# Patient Record
Sex: MALE | Race: WHITE | URBAN - METROPOLITAN AREA
[De-identification: names, ages, dates, MRNs, and addresses within clinical notes are randomized per-mention and may not be internally consistent; named-entity substitution may affect disease eponyms.]

---

## 2024-11-12 ENCOUNTER — APPOINTMENT (OUTPATIENT)
Dept: UROLOGY | Facility: CLINIC | Age: 39
End: 2024-11-12
Payer: COMMERCIAL

## 2024-12-09 ENCOUNTER — APPOINTMENT (OUTPATIENT)
Dept: UROLOGY | Facility: HOSPITAL | Age: 39
End: 2024-12-09
Payer: COMMERCIAL

## 2025-01-06 ENCOUNTER — APPOINTMENT (OUTPATIENT)
Dept: UROLOGY | Facility: HOSPITAL | Age: 40
End: 2025-01-06
Payer: COMMERCIAL

## 2025-01-06 DIAGNOSIS — Z30.09 VASECTOMY EVALUATION: Primary | ICD-10-CM

## 2025-01-06 PROCEDURE — 99213 OFFICE O/P EST LOW 20 MIN: CPT | Performed by: UROLOGY

## 2025-01-06 PROCEDURE — 99203 OFFICE O/P NEW LOW 30 MIN: CPT | Performed by: UROLOGY

## 2025-01-06 PROCEDURE — 1036F TOBACCO NON-USER: CPT | Performed by: UROLOGY

## 2025-01-06 ASSESSMENT — PATIENT HEALTH QUESTIONNAIRE - PHQ9
2. FEELING DOWN, DEPRESSED OR HOPELESS: NOT AT ALL
1. LITTLE INTEREST OR PLEASURE IN DOING THINGS: NOT AT ALL
SUM OF ALL RESPONSES TO PHQ9 QUESTIONS 1 AND 2: 0

## 2025-01-06 NOTE — PROGRESS NOTES
Subjective   Carlton Macdonald is a 39 y.o. male here for evaluation regarding elective sterilization. he is aware of alternatives to vasectomy.     Previous Children: 4 (12, 10 10, 4)  Previous scrotal surgeries? no  Any current scrotal pain? no  Taking blood thinners? no    No past medical history on file.    No past surgical history on file.       Your medication list      as of January 6, 2025  9:25 AM     You have not been prescribed any medications.         Not on File     Exam  Testicles descended bilaterally, vas palpable  Penis without lesions  Testicles high, difficult exam    #Vasectomy  Desires elective vasectomy.  In summary, the patient has a history of fecundity anxiety and would like to proceed with a vasectomy.  I had a detailed discussion with him regarding the risks, benefits and alternatives to the procedure and he would like to proceed at this time.      I discussed the following with the patient:    · Vasectomy is intended to be a permanent form of contraception.  · Vasectomy does not produce immediate sterility.  · Following vasectomy, another form of contraception is required until vas occlusion is confirmed by post- vasectomy semen analysis (PVSA).  · Even after vas occlusion is confirmed, vasectomy is not 100% reliable in preventing pregnancy.  · The risk of pregnancy after vasectomy is approximately 1 in 2,000 for men who have post-vasectomy azoospermia or PVSA showing rare non-motile sperm (RNMS).  · Repeat vasectomy is necessary in <1% of vasectomies, provided that a technique for vas occlusion known to have a low occlusive failure rate has been used.  · Patients should refrain from ejaculation for approximately one week after vasectomy.  · Options for fertility after vasectomy include vasectomy reversal and sperm retrieval with in vitro fertilization. These options are not always successful, and they may be expensive.    Sperm banking was also offered to the patient.    He understands  that he must have protected intercourse after the vasectomy until he is able to provide a negative semen sample.  He may stop using other methods of contraception when examination of one well-mixed, uncentrifuged, fresh post-vasectomy semen specimen shows azoospermia or only rare non-motile sperm (RNMS or <100,000 non-motile sperm/mL).    Will proceed with vasectomy with: General  Sperm Banking information given per patient request : no

## 2025-01-07 PROBLEM — Z30.09 VASECTOMY EVALUATION: Status: ACTIVE | Noted: 2025-01-06

## 2025-02-13 ENCOUNTER — APPOINTMENT (OUTPATIENT)
Dept: DERMATOLOGY | Facility: CLINIC | Age: 40
End: 2025-02-13
Payer: COMMERCIAL

## 2025-02-13 DIAGNOSIS — L91.8 SKIN TAG: ICD-10-CM

## 2025-02-13 DIAGNOSIS — B07.8 COMMON WART: Primary | ICD-10-CM

## 2025-02-13 PROCEDURE — 99203 OFFICE O/P NEW LOW 30 MIN: CPT | Performed by: DERMATOLOGY

## 2025-02-13 RX ORDER — IMIQUIMOD 12.5 MG/.25G
CREAM TOPICAL
Qty: 30 PACKET | Refills: 0 | Status: SHIPPED | OUTPATIENT
Start: 2025-02-13

## 2025-02-13 NOTE — PROGRESS NOTES
Dermatology Allergy Patch Testing Visit:     Subjective   Carlton Macdonald was {Referred by:[id number]}.    Carlton Macdonald is a 39 y.o. male who presents for reason for visit of No chief complaint on file..                                                                                    Objective   Physical Exam    Assessment/Plan :  Response:

## 2025-02-13 NOTE — PROGRESS NOTES
Subjective     Carlton Macdonald is a 39 y.o. male who presents for the following: Skin Check (Skin check-preventative (lots of moles)  & has a wart on palm of right hand for several years and will not go away). Wart on right hand.  Above the waist examined.  He does a lot of work around the house with his hands.  No other calluses on his hands.      Review of Systems:  No other skin or systemic complaints other than what is documented elsewhere in the note.    The following portions of the chart were reviewed this encounter and updated as appropriate:          Skin Cancer History  No skin cancer on file.      Specialty Problems    None       Objective   Well appearing patient in no apparent distress; mood and affect are within normal limits.    A focused skin examination was performed. All findings within normal limits unless otherwise noted below.    Assessment/Plan

## 2025-02-26 ENCOUNTER — APPOINTMENT (OUTPATIENT)
Dept: PREADMISSION TESTING | Facility: HOSPITAL | Age: 40
End: 2025-02-26
Payer: COMMERCIAL

## 2025-03-05 ENCOUNTER — PRE-ADMISSION TESTING (OUTPATIENT)
Dept: PREADMISSION TESTING | Facility: HOSPITAL | Age: 40
End: 2025-03-05
Payer: COMMERCIAL

## 2025-03-05 VITALS
RESPIRATION RATE: 16 BRPM | WEIGHT: 168.2 LBS | OXYGEN SATURATION: 99 % | BODY MASS INDEX: 26.4 KG/M2 | SYSTOLIC BLOOD PRESSURE: 134 MMHG | HEART RATE: 55 BPM | HEIGHT: 67 IN | TEMPERATURE: 97.9 F | DIASTOLIC BLOOD PRESSURE: 86 MMHG

## 2025-03-05 DIAGNOSIS — Z30.09 VASECTOMY EVALUATION: ICD-10-CM

## 2025-03-05 PROCEDURE — 99203 OFFICE O/P NEW LOW 30 MIN: CPT

## 2025-03-05 RX ORDER — BISMUTH SUBSALICYLATE 262 MG
1 TABLET,CHEWABLE ORAL DAILY
COMMUNITY

## 2025-03-05 ASSESSMENT — DUKE ACTIVITY SCORE INDEX (DASI)
CAN YOU DO LIGHT WORK AROUND THE HOUSE LIKE DUSTING OR WASHING DISHES: YES
CAN YOU WALK INDOORS, SUCH AS AROUND YOUR HOUSE: YES
TOTAL_SCORE: 58.2
CAN YOU RUN A SHORT DISTANCE: YES
CAN YOU TAKE CARE OF YOURSELF (EAT, DRESS, BATHE, OR USE TOILET): YES
DASI METS SCORE: 9.9
CAN YOU CLIMB A FLIGHT OF STAIRS OR WALK UP A HILL: YES
CAN YOU PARTICIPATE IN STRENOUS SPORTS LIKE SWIMMING, SINGLES TENNIS, FOOTBALL, BASKETBALL, OR SKIING: YES
CAN YOU PARTICIPATE IN MODERATE RECREATIONAL ACTIVITIES LIKE GOLF, BOWLING, DANCING, DOUBLES TENNIS OR THROWING A BASEBALL OR FOOTBALL: YES
CAN YOU DO YARD WORK LIKE RAKING LEAVES, WEEDING OR PUSHING A MOWER: YES
CAN YOU HAVE SEXUAL RELATIONS: YES
CAN YOU DO HEAVY WORK AROUND THE HOUSE LIKE SCRUBBING FLOORS OR LIFTING AND MOVING HEAVY FURNITURE: YES
CAN YOU DO MODERATE WORK AROUND THE HOUSE LIKE VACUUMING, SWEEPING FLOORS OR CARRYING GROCERIES: YES
CAN YOU WALK A BLOCK OR TWO ON LEVEL GROUND: YES

## 2025-03-05 NOTE — H&P (VIEW-ONLY)
[facility]/PAT Evaluation       Name: Carlton Macdonald (Carlton Macdonald)  /Age: 1985/39 y.o.     In-Person       Chief Complaint: Vasectomy evaluation     HPI  Patient is an alert and oriented 39 year old male scheduled for a vasectomy on 3/14/2025 with Dr. Cat for  elective sterilization. He denies pain and does not endorse any abnormal urinary symptoms. Presents to [facility] today for perioperative risk stratification and optimization.     No past medical history on file.    Past Surgical History:   Procedure Laterality Date    CHOLESTEATOMA EXCISION       Patient  has no history on file for sexual activity.    No family history on file.    No Known Allergies    Prior to Admission medications    Medication Sig Start Date End Date Taking? Authorizing Provider   multivitamin tablet Take 1 tablet by mouth once daily.   Yes Historical Provider, MD   imiquimod (Aldara) 5 % cream Apply to the affected asha of the Once daily. Wash hands thoroughly after each application. 25   Zaheer Menendez MD       Review of Systems   Constitutional: Negative for chills, decreased appetite, diaphoresis, fever and malaise/fatigue.   Eyes:  Negative for blurred vision and double vision.   Cardiovascular:  Negative for chest pain, claudication, cyanosis, dyspnea on exertion, irregular heartbeat, leg swelling, near-syncope and palpitations.   Respiratory:  Negative for cough, hemoptysis, shortness of breath and wheezing.    Endocrine: Negative for cold intolerance, heat intolerance, polydipsia, polyphagia and polyuria.   Gastrointestinal:  Negative for abdominal pain, constipation, diarrhea, dysphagia, nausea and vomiting.   Genitourinary:  Negative for bladder incontinence, dysuria, hematuria, incomplete emptying, nocturia, frequency, pelvic pain and urgency.   Neurological:  Negative for headaches, light-headedness, paresthesias, sensory change and weakness.   Psychiatric/Behavioral:  Negative for altered mental status.   "  Musculoskeletal: Negative for myalgias, arthralgias     Vitals and nursing note reviewed.     Physical exam  Constitutional:       Appearance: Normal appearance. He is Overweight.   HENT:      Head: Normocephalic and atraumatic.      Mouth/Throat:      Mouth: Mucous membranes are moist.      Pharynx: Oropharynx is clear.   Eyes:      Extraocular Movements: Extraocular movements intact.      Conjunctiva/sclera: Conjunctivae normal.      Pupils: Pupils are equal, round, and reactive to light.   Cardiovascular:      PMI at left midclavicular line. Normal rate. Regular rhythm. Normal S1. Normal S2.       Murmurs: There is no murmur.      No gallop.  No click. No rub.       No audible carotid bruit     No lower extremity edema on exam  Pulmonary:      Effort: Pulmonary effort is normal.      Breath sounds: Normal breath sounds.   Abdominal:      General: Abdomen is flat. Bowel sounds are normal.      Palpations: Abdomen is soft and non-tender  Musculoskeletal:      Cervical back: Normal range of motion and neck supple.   Skin:     General: Skin is warm and dry.      Capillary Refill: Capillary refill takes less than 2 seconds.   Neurological:      General: No focal deficit present.      Mental Status: He is alert and oriented to person, place, and time. Mental status is at baseline.   Psychiatric:         Mood and Affect: Mood normal.         Behavior: Behavior normal.         Thought Content: Thought content normal.         Judgment: Judgment normal.     Vitals and nursing note reviewed. Physical exam within normal limits.     Visit Vitals  /86   Pulse 55   Temp 36.6 °C (97.9 °F)   Resp 16   Ht 1.702 m (5' 7\")   Wt 76.3 kg (168 lb 3.2 oz)   SpO2 99%   BMI 26.34 kg/m²   Smoking Status Never   BSA 1.9 m²       DASI Risk Score      Flowsheet Row Pre-Admission Testing from 3/5/2025 in [facility]   Can you take care of yourself (eat, dress, bathe, or use toilet)?  2.75 filed at 03/05/2025 1635   Can " you walk indoors, such as around your house? 1.75 filed at 03/05/2025 1635   Can you walk a block or two on level ground?  2.75 filed at 03/05/2025 1635   Can you climb a flight of stairs or walk up a hill? 5.5 filed at 03/05/2025 1635   Can you run a short distance? 8 filed at 03/05/2025 1635   Can you do light work around the house like dusting or washing dishes? 2.7 filed at 03/05/2025 1635   Can you do moderate work around the house like vacuuming, sweeping floors or carrying groceries? 3.5 filed at 03/05/2025 1635   Can you do heavy work around the house like scrubbing floors or lifting and moving heavy furniture?  8 filed at 03/05/2025 1635   Can you do yard work like raking leaves, weeding or pushing a mower? 4.5 filed at 03/05/2025 1635   Can you have sexual relations? 5.25 filed at 03/05/2025 1635   Can you participate in moderate recreational activities like golf, bowling, dancing, doubles tennis or throwing a baseball or football? 6 filed at 03/05/2025 1635   Can you participate in strenous sports like swimming, singles tennis, football, basketball, or skiing? 7.5 filed at 03/05/2025 1635   DASI SCORE 58.2 filed at 03/05/2025 1635   METS Score (Will be calculated only when all the questions are answered) 9.9 filed at 03/05/2025 1635          Caprini DVT Assessment      Flowsheet Row Pre-Admission Testing from 3/5/2025 in [facility]   DVT Score (IF A SCORE IS NOT CALCULATING, MUST SELECT A BMI TO COMPLETE) 3 filed at 03/05/2025 1635   Surgical Factors Minor surgery planned filed at 03/05/2025 1635   BMI (BMI MUST BE CHOSEN) 30 or less filed at 03/05/2025 1635          Modified Frailty Index      Flowsheet Row Pre-Admission Testing from 3/5/2025 in [facility]   Non-independent functional status (problems with dressing, bathing, personal grooming, or cooking) 0 filed at 03/05/2025 1635   History of diabetes mellitus  0 filed at 03/05/2025 1635   History of COPD 0 filed at  03/05/2025 1635   History of CHF No filed at 03/05/2025 1635   History of MI 0 filed at 03/05/2025 1635   History of Percutaneous Coronary Intervention, Cardiac Surgery, or Angina No filed at 03/05/2025 1635   Hypertension requiring the use of medication  0 filed at 03/05/2025 1635   Peripheral vascular disease 0 filed at 03/05/2025 1635   Impaired sensorium (cognitive impairement or loss, clouding, or delirium) 0 filed at 03/05/2025 1635   TIA or CVA withouy residual deficit 0 filed at 03/05/2025 1635   Cerebrovascular accident with deficit 0 filed at 03/05/2025 1635   Modified Frailty Index Calculator 0 filed at 03/05/2025 1635          [id number] Stroke Risk Points  Current as of just now        N/A 0 to 9 Points:      Last Change: N/A          The [id number] risk score (Lip SANG, et al. 2009. © 2010 [employer]) quantifies the risk of stroke for a patient with atrial fibrillation. For patients without atrial fibrillation or under the age of 18 this score appears as N/A. Higher score values generally indicate higher risk of stroke.        This score is not applicable to this patient. Components are not calculated.          Revised Cardiac Risk Index      Flowsheet Row Pre-Admission Testing from 3/5/2025 in [facility]   High-Risk Surgery (Intraperitoneal, Intrathoracic,Suprainguinal vascular) 0 filed at 03/05/2025 1635   History of ischemic heart disease (History of MI, History of positive exercuse test, Current chest paint considered due to myocardial ischemia, Use of nitrate therapy, ECG with pathological Q Waves) 0 filed at 03/05/2025 1635   History of congestive heart failure (pulmonary edemia, bilateral rales or S3 gallop, Paroxysmal nocturnal dyspnea, CXR showing pulmonary vascular redistribution) 0 filed at 03/05/2025 1635   History of cerebrovascular disease (Prior TIA or stroke) 0 filed at 03/05/2025 1635   Pre-operative insulin treatment 0 filed at 03/05/2025  1635   Pre-operative creatinine>2 mg/dl 0 filed at 03/05/2025 1635   Revised Cardiac Risk Calculator 0 filed at 03/05/2025 1635          Apfel Simplified Score    No data to display       Risk Analysis Index Results This Encounter    No data found in the last 10 encounters.       Stop Bang Score      Flowsheet Row Pre-Admission Testing from 3/5/2025 in [facility]   Do you snore loudly? 0 filed at 03/05/2025 1619   Do you often feel tired or fatigued after your sleep? 0 filed at 03/05/2025 1619   Has anyone ever observed you stop breathing in your sleep? 0 filed at 03/05/2025 1619   Do you have or are you being treated for high blood pressure? 0 filed at 03/05/2025 1619   Recent BMI (Calculated) 26.3 filed at 03/05/2025 1619   Is BMI greater than 35 kg/m2? 0=No filed at 03/05/2025 1619   Age older than 50 years old? 0=No filed at 03/05/2025 1619   Is your neck circumference greater than 17 inches (Male) or 16 inches (Female)? 0 filed at 03/05/2025 1619   Gender - Male 1=Yes filed at 03/05/2025 1619   STOP-BANG Total Score 1 filed at 03/05/2025 1619          Prodigy: High Risk  Total Score: 8              Prodigy Gender Score          ARISCAT Score for Postoperative Pulmonary Complications      Flowsheet Row Pre-Admission Testing from 3/5/2025 in [facility]   Age Calculated Score 0 filed at 03/05/2025 1636   Preoperative SpO2 0 filed at 03/05/2025 1636   Respiratory infection in the last month Either upper or lower (i.e., URI, bronchitis, pneumonia), with fever and antibiotic treatment 0 filed at 03/05/2025 1636   Preoperative anemia (Hgb less than 10 g/dl) 0 filed at 03/05/2025 1636   Surgical incision  0 filed at 03/05/2025 1636   Duration of surgery  0 filed at 03/05/2025 1636   Emergency Procedure  0 filed at 03/05/2025 1636   ARISCAT Total Score  0 filed at 03/05/2025 1636          Rojas Perioperative Risk for Myocardial Infarction or Cardiac Arrest (RIAZ)      Flowsheet Row  Pre-Admission Testing from 3/5/2025 in [facility]   Calculated Age Score 0.8 filed at 03/05/2025 1636   Functional Status  0 filed at 03/05/2025 1636   ASA Class  -5.17 filed at 03/05/2025 1636   Creatinine 0 filed at 03/05/2025 1636   Type of Procedure  -0.26 filed at 03/05/2025 1636   RIAZ Total Score  -9.88 filed at 03/05/2025 1636   RIAZ % 0.01 filed at 03/05/2025 1636          Assessment & Plan:    Neuro:  No diagnosis or significant findings on chart review or clinical presentation and evaluation.     HEENT/Airway:  No diagnosis or significant findings on chart review or clinical presentation and evaluation.   STOP-BANG Score-1 points low risk for ESTELLE    Mallampati::  II    TM distance::  >3 FB    Neck ROM::  Full  Dentures-denies  Crowns-denies  Implants-denies    Cardiovascular:  No diagnosis or significant findings on chart review or clinical presentation and evaluation.   METS: 9.9  RCRI: 0 points, 3.9%  risk for postoperative MACE   RIAZ: 0.01% risk for postoperative MACE    Pulmonary:  No diagnosis or significant findings on chart review or clinical presentation and evaluation.   ARISCAT: <26 points, 1.6% risk of in-hospital postoperative pulmonary complication  PRODIGY: Moderate risk for opioid induced respiratory depression  Smoking History-He has never smoked.  Discussed smoking cessation and deep breathing handout given    Renal/Urinary:  No diagnosis or significant findings on chart review or clinical presentation and evaluation, however, the patient is at increased risk of perioperative renal complications secondary to male sex. Preventative measures include BP monitoring, medication compliance, and hydration management.   CMP-Reviewed, stable  Creatinine-1.06  GFR-91    Endocrine:  No diagnosis or significant findings on chart review or clinical presentation and evaluation.   DRO0M-4.1%    Hematologic/Immunology:  No diagnosis or significant findings on chart review or clinical  presentation and evaluation.  The patient is not a Jehovah’s witness and will accept blood and blood products if medically indicated.   History of previous blood transfusions No  CBC-Reviewed, stable  HGB-15.0  Positive for thrombocytopenia-Platelets 141. No abnormal bruising or bleeding.   Caprini Score 3, patient at Moderate for postoperative DVT. Pt supplied education/VTE handout  Anticoagulation use: No     Gastrointestinal:   No diagnosis or significant findings on chart review or clinical presentation and evaluation.   Recreational drug use: alcohol  Alcohol use social drinker    Infectious disease:   No diagnosis or significant findings on chart review or clinical presentation and evaluation.     Musculoskeletal:   No diagnosis or significant findings on chart review or clinical presentation and evaluation.   JHFRAT score-0 points. low risk for falls    Anesthesia:  ASA 1 - Normal health patient  Anticipated anesthesia-general  History of General anesthesia- yes  Complications- No anesthesia complications  No family history of anesthesia complications    Abnormalities noted on PAT evaluation: No    Labs & Imaging ordered:  NA  Nickel/metal allergy-negative  Shellfish allergy-negative    Overall, patient Low Risk for the scheduled Low Risk surgery. Discussed with patient medication instructions, NPO guidelines, and any questions or concerns.     Face to face time spent 30 minutes

## 2025-03-05 NOTE — PREPROCEDURE INSTRUCTIONS
Medication List            Accurate as of March 5, 2025  4:24 PM. Always use your most recent med list.                imiquimod 5 % cream  Commonly known as: Aldara  Apply to the affected asha of the Once daily. Wash hands thoroughly after each application.  Medication Adjustments for Surgery: Do Not take on the morning of surgery  Notes to patient: Last dose preoperatively 3/13/2025     multivitamin tablet  Additional Medication Adjustments for Surgery: Take last dose 7 days before surgery  Notes to patient: Last dose preoperatively 3/6/2025            NPO Instructions:     Do not eat any food after midnight the night before your surgery/procedure.  You may have clear liquids until TWO hours before surgery/procedure. This includes water, black tea/coffee, (no milk or cream) apple juice and electrolyte drinks (Gatorade).  You may chew gum up to TWO hours before your surgery/procedure.     Additional Instructions:      Seven/Six Days before Surgery:  Review your medication instructions, stop indicated medications  Five Days before Surgery:  Review your medication instructions, stop indicated medications  Three Days before Surgery:  Review your medication instructions, stop indicated medications  The Day before Surgery:  No smoking or alcohol use 24 hours before surgery  Review your medication instructions, stop indicated medications  You will be contacted regarding the time of your arrival to facility and surgery time  Do not eat any food after Midnight  Day of Surgery:  Review your medication instructions, take indicated medications  If you have diabetes, please check your fasting blood sugar upon awakening.  If fasting blood sugar is <80 mg/dl, drink 100 ml of apple juice, time limit of 2 hours before  You may have clear liquids until TWO hours before surgery/procedure.  This includes water, black tea/coffee, (no milk or cream) apple juice and electrolyte drinks (Gatorade)  You may chew gum up to TWO hours  before your surgery/procedure  Wear  comfortable loose fitting clothing  Do not use moisturizers, creams, lotions or perfume  All jewelry and valuables should be left at home     CONTACT SURGEON'S OFFICE IF YOU DEVELOP:  * Fever = 100.4 F   * New respiratory symptoms (e.g. cough, shortness of breath, respiratory distress, sore throat)  * Recent loss of taste or smell  *Flu like symptoms such as headache, fatigue or gastrointestinal symptoms  * You develop any open sores, shingles, burning or painful urination   AND/OR:  * You no longer wish to have the surgery.  * Any other personal circumstances change that may lead to the need to cancel or defer this surgery.  *You were admitted to any hospital within one week of your planned procedure.     SMOKING:  *Quitting smoking can make a huge difference to your health and recovery from surgery.    *If you need help with quitting, call 3-031-QUIT-NOW.     THE DAY BEFORE SURGERY:  *Do not eat any food after midnight the night before your surgery.   *You may have up to TEN OUNCES of clear liquids until TWO hours before your instructed ARRIVAL TIME to hospital. This includes water, black tea/coffee, (no milk or cream) apple juice, clear broth and electrolyte drinks (Gatorade). Please avoid clear liquids that are red in color.   *You may chew gum/mints up to TWO hours before your surgery/procedure.     SURGICAL TIME:  *You will be contacted between 2 p.m. and 3 p.m. the business day before your surgery with your arrival time.  *If you haven't received a call by 3pm, call [phone]  *Scheduled surgery times may change and you will be notified if this occurs-check your personal voicemail for any updates.     ON THE MORNING OF SURGERY:  *Wear comfortable, loose fitting clothing.   *Do not use moisturizers, creams, lotions or perfume.  *All jewelry and valuables should be left at home.  *Prosthetic devices such as contact lenses, hearing aids, dentures, eyelash extensions,  hairpins and body piercing must be removed before surgery.     BRING WITH YOU:  *Photo ID and insurance card  *Current list of medications and allergies  *Pacemaker/Defibrillator/Heart stent cards  *CPAP machine and mask  *Slings/splints/crutches  *Copy of your complete Advanced Directive/DHPOA-if applicable  *Neurostimulator implant remote     PARKING AND ARRIVAL:  *Check in at the Main Entrance desk and let them know you are here for surgery.     IF YOU ARE HAVING OUTPATIENT/SAME DAY SURGERY:  *A responsible adult MUST accompany you at the time of discharge and stay with you for 24 hours after your surgery.  *You may NOT drive yourself home after surgery.  *You may use a taxi or ride sharing service ([employer], [employer]) to return home ONLY if you are accompanied by a friend or family member.  *Instructions for resuming your medications will be provided by your surgeon.     Thank you for coming to Pre Admission testing.      If I have prescribed medication please don't forget to  at your pharmacy.      Any questions about today's visit call [phone] and leave a message in the general mailbox.     Patient instructed to ambulate as soon as possible postoperatively to decrease thromboembolic risk.     Michael Baeza, APRN-CNP     Thank you for visiting [facility].  If you have any changes to your health condition, please call the surgeons office to alert them and give them details of your symptoms.        Preoperative Fasting Guidelines     Why must I stop eating and drinking near surgery time?  With sedation, food or liquid in your stomach can enter your lungs causing serious complications  Increases nausea and vomiting     When do I need to stop eating and drinking before my surgery?  Do not eat any food after midnight the night before your surgery/procedure.  You may have up to TEN ounces of clear liquid until TWO hours before your instructed arrival time to the hospital.  This  includes water, black tea/coffee, (no milk or cream) apple juice, and electrolyte drinks (Gatorade)  You may chew gum until TWO hours before your surgery/procedure        Additional Instructions:      The Day before Surgery:  -Review your medication instructions, stop indicated medications  -You will be contacted in the evening regarding the time of your arrival to facility and surgery time     Day of Surgery:  -Review your medication instructions, take indicated medications  -Wear comfortable loose fitting clothing  -Do not use moisturizers, creams, lotions or perfume  -All jewelry and valuables should be left at home                   Preoperative Brain Exercises     What are brain exercises?  A brain exercise is any activity that engages your thinking (cognitive) skills.     What types of activities are considered brain exercises?  Jigsaw puzzles, crossword puzzles, word jumble, memory games, word search, and many more.  Many can be found free online or on your phone via a mobile dawson.     Why should I do brain exercises before my surgery?  More recent research has shown brain exercise before surgery can lower the risk of postoperative delirium (confusion) which can be especially important for older adults.  Patients who did brain exercises for 5 to 10 minutes/day in the days before surgery, cut their risk of postoperative delirium in half up to 1 week after surgery.                         [facility]     Preoperative Deep Breathing Exercises     Why it is important to do deep breathing exercises before my surgery?  Deep breathing exercises strengthen your breathing muscles.  This helps you to recover after your surgery and decreases the chance of breathing complications.        How are the deep breathing exercises done?  Sit straight with your back supported.  Breathe in deeply and slowly through your nose. Your lower rib cage should expand and your abdomen may move forward.  Hold that  breath for 3 to 5 seconds.  Breathe out through pursed lips, slowly and completely.  Rest and repeat 10 times every hour while awake.  Rest longer if you become dizzy or lightheaded.                      [facility]     Preoperative Deep Breathing Exercises     Why it is important to do deep breathing exercises before my surgery?  Deep breathing exercises strengthen your breathing muscles.  This helps you to recover after your surgery and decreases the chance of breathing complications.        How are the deep breathing exercises done?  Sit straight with your back supported.  Breathe in deeply and slowly through your nose. Your lower rib cage should expand and your abdomen may move forward.  Hold that breath for 3 to 5 seconds.  Breathe out through pursed lips, slowly and completely.  Rest and repeat 10 times every hour while awake.  Rest longer if you become dizzy or lightheaded.        Patient Information: Incentive Spirometer  What is an incentive spirometer?  An incentive spirometer is a device used before and after surgery to “exercise” your lungs.  It helps you to take deeper breaths to expand your lungs.  Below is an example of a basic incentive spirometer.  The device you receive may differ slightly but they all function the same.    Why do I need to use an incentive spirometer?  Using your incentive spirometer prepares your lungs for surgery and helps prevent lung problems after surgery.  How do I use my incentive spirometer?  When you're using your incentive spirometer, make sure to breathe through your mouth. If you breathe through your nose, the incentive spirometer won't work properly. You can hold your nose if you have trouble.  If you feel dizzy at any time, stop and rest. Try again at a later time.  Follow the steps below:  Set up your incentive spirometer, expand the flexible tubing and connect to the outlet.  Sit upright in a chair or bed. Hold the incentive spirometer at  eye level.   Put the mouthpiece in your mouth and close your lips tightly around it. Slowly breathe out (exhale) completely.  Breathe in (inhale) slowly through your mouth as deeply as you can. As you take a breath, you will see the piston rise inside the large column. While the piston rises, the indicator should move upwards. It should stay in between the 2 arrows (see Figure).  Try to get the piston as high as you can, while keeping the indicator between the arrows.   If the indicator doesn't stay between the arrows, you're breathing either too fast or too slow.  When you get it as high as you can, hold your breath for 10 seconds, or as long as possible. While you're holding your breath, the piston will slowly fall to the base of the spirometer.  Once the piston reaches the bottom of the spirometer, breathe out slowly through your mouth. Rest for a few seconds.  Repeat 10 times. Try to get the piston to the same level with each breath.  Repeat every hour while awake  You can carefully clean the outside of the mouthpiece with an alcohol wipe or soap and water.       Patient and Family Education             Ways You Can Help Prevent Blood Clots                    This handout explains some simple things you can do to help prevent blood clots.      Blood clots are blockages that can form in the body's veins. When a blood clot forms in your deep veins, it may be called a deep vein thrombosis, or DVT for short. Blood clots can happen in any part of the body where blood flows, but they are most common in the arms and legs. If a piece of a blood clot breaks free and travels to the lungs, it is called a pulmonary embolus (PE). A PE can be a very serious problem.         Being in the hospital or having surgery can raise your chances of getting a blood clot because you may not be well enough to move around as much as you normally do.         Ways you can help prevent blood clots in the hospital           Wearing SCDs. SCDs  stands for Sequential Compression Devices.   SCDs are special sleeves that wrap around your legs  They attach to a pump that fills them with air to gently squeeze your legs every few minutes.   This helps return the blood in your legs to your heart.   SCDs should only be taken off when walking or bathing.   SCDs may not be comfortable, but they can help save your life.                                            Wearing compression stockings - if your doctor orders them. These special snug fitting stockings gently squeeze your legs to help blood flow.       Walking. Walking helps move the blood in your legs.   If your doctor says it is ok, try walking the halls at least   5 times a day. Ask us to help you get up, so you don't fall.      Taking any blood thinning medicines your doctor orders.        Page 1 of 2            [facility]; 3/23   Ways you can help prevent blood clots at home         Wearing compression stockings - if your doctor orders them. ? Walking - to help move the blood in your legs.       Taking any blood thinning medicines your doctor orders.      Signs of a blood clot or PE        Tell your doctor or nurse know right away if you have of the problems listed below.    If you are at home, seek medical care right away. Call 911 for chest pain or problems breathing.                Signs of a blood clot (DVT) - such as pain,  swelling, redness or warmth in your arm or leg      Signs of a pulmonary embolism (PE) - such as chest pain or feeling short of breath

## 2025-03-05 NOTE — CPM/PAT H&P
[facility]/PAT Evaluation       Name: Carlton Macdonald (Carlton Macdonald)  /Age: 1985/39 y.o.     In-Person       Chief Complaint: Vasectomy evaluation     HPI  Patient is an alert and oriented 39 year old male scheduled for a vasectomy on 3/14/2025 with Dr. Cat for  elective sterilization. He denies pain and does not endorse any abnormal urinary symptoms. Presents to [facility] today for perioperative risk stratification and optimization.     No past medical history on file.    Past Surgical History:   Procedure Laterality Date    CHOLESTEATOMA EXCISION       Patient  has no history on file for sexual activity.    No family history on file.    No Known Allergies    Prior to Admission medications    Medication Sig Start Date End Date Taking? Authorizing Provider   multivitamin tablet Take 1 tablet by mouth once daily.   Yes Historical Provider, MD   imiquimod (Aldara) 5 % cream Apply to the affected asha of the Once daily. Wash hands thoroughly after each application. 25   Zaheer Menendez MD       Review of Systems   Constitutional: Negative for chills, decreased appetite, diaphoresis, fever and malaise/fatigue.   Eyes:  Negative for blurred vision and double vision.   Cardiovascular:  Negative for chest pain, claudication, cyanosis, dyspnea on exertion, irregular heartbeat, leg swelling, near-syncope and palpitations.   Respiratory:  Negative for cough, hemoptysis, shortness of breath and wheezing.    Endocrine: Negative for cold intolerance, heat intolerance, polydipsia, polyphagia and polyuria.   Gastrointestinal:  Negative for abdominal pain, constipation, diarrhea, dysphagia, nausea and vomiting.   Genitourinary:  Negative for bladder incontinence, dysuria, hematuria, incomplete emptying, nocturia, frequency, pelvic pain and urgency.   Neurological:  Negative for headaches, light-headedness, paresthesias, sensory change and weakness.   Psychiatric/Behavioral:  Negative for altered mental status.   "  Musculoskeletal: Negative for myalgias, arthralgias     Vitals and nursing note reviewed.     Physical exam  Constitutional:       Appearance: Normal appearance. He is Overweight.   HENT:      Head: Normocephalic and atraumatic.      Mouth/Throat:      Mouth: Mucous membranes are moist.      Pharynx: Oropharynx is clear.   Eyes:      Extraocular Movements: Extraocular movements intact.      Conjunctiva/sclera: Conjunctivae normal.      Pupils: Pupils are equal, round, and reactive to light.   Cardiovascular:      PMI at left midclavicular line. Normal rate. Regular rhythm. Normal S1. Normal S2.       Murmurs: There is no murmur.      No gallop.  No click. No rub.       No audible carotid bruit     No lower extremity edema on exam  Pulmonary:      Effort: Pulmonary effort is normal.      Breath sounds: Normal breath sounds.   Abdominal:      General: Abdomen is flat. Bowel sounds are normal.      Palpations: Abdomen is soft and non-tender  Musculoskeletal:      Cervical back: Normal range of motion and neck supple.   Skin:     General: Skin is warm and dry.      Capillary Refill: Capillary refill takes less than 2 seconds.   Neurological:      General: No focal deficit present.      Mental Status: He is alert and oriented to person, place, and time. Mental status is at baseline.   Psychiatric:         Mood and Affect: Mood normal.         Behavior: Behavior normal.         Thought Content: Thought content normal.         Judgment: Judgment normal.     Vitals and nursing note reviewed. Physical exam within normal limits.     Visit Vitals  /86   Pulse 55   Temp 36.6 °C (97.9 °F)   Resp 16   Ht 1.702 m (5' 7\")   Wt 76.3 kg (168 lb 3.2 oz)   SpO2 99%   BMI 26.34 kg/m²   Smoking Status Never   BSA 1.9 m²       DASI Risk Score      Flowsheet Row Pre-Admission Testing from 3/5/2025 in [facility]   Can you take care of yourself (eat, dress, bathe, or use toilet)?  2.75 filed at 03/05/2025 1635   Can " you walk indoors, such as around your house? 1.75 filed at 03/05/2025 1635   Can you walk a block or two on level ground?  2.75 filed at 03/05/2025 1635   Can you climb a flight of stairs or walk up a hill? 5.5 filed at 03/05/2025 1635   Can you run a short distance? 8 filed at 03/05/2025 1635   Can you do light work around the house like dusting or washing dishes? 2.7 filed at 03/05/2025 1635   Can you do moderate work around the house like vacuuming, sweeping floors or carrying groceries? 3.5 filed at 03/05/2025 1635   Can you do heavy work around the house like scrubbing floors or lifting and moving heavy furniture?  8 filed at 03/05/2025 1635   Can you do yard work like raking leaves, weeding or pushing a mower? 4.5 filed at 03/05/2025 1635   Can you have sexual relations? 5.25 filed at 03/05/2025 1635   Can you participate in moderate recreational activities like golf, bowling, dancing, doubles tennis or throwing a baseball or football? 6 filed at 03/05/2025 1635   Can you participate in strenous sports like swimming, singles tennis, football, basketball, or skiing? 7.5 filed at 03/05/2025 1635   DASI SCORE 58.2 filed at 03/05/2025 1635   METS Score (Will be calculated only when all the questions are answered) 9.9 filed at 03/05/2025 1635          Caprini DVT Assessment      Flowsheet Row Pre-Admission Testing from 3/5/2025 in [facility]   DVT Score (IF A SCORE IS NOT CALCULATING, MUST SELECT A BMI TO COMPLETE) 3 filed at 03/05/2025 1635   Surgical Factors Minor surgery planned filed at 03/05/2025 1635   BMI (BMI MUST BE CHOSEN) 30 or less filed at 03/05/2025 1635          Modified Frailty Index      Flowsheet Row Pre-Admission Testing from 3/5/2025 in [facility]   Non-independent functional status (problems with dressing, bathing, personal grooming, or cooking) 0 filed at 03/05/2025 1635   History of diabetes mellitus  0 filed at 03/05/2025 1635   History of COPD 0 filed at  03/05/2025 1635   History of CHF No filed at 03/05/2025 1635   History of MI 0 filed at 03/05/2025 1635   History of Percutaneous Coronary Intervention, Cardiac Surgery, or Angina No filed at 03/05/2025 1635   Hypertension requiring the use of medication  0 filed at 03/05/2025 1635   Peripheral vascular disease 0 filed at 03/05/2025 1635   Impaired sensorium (cognitive impairement or loss, clouding, or delirium) 0 filed at 03/05/2025 1635   TIA or CVA withouy residual deficit 0 filed at 03/05/2025 1635   Cerebrovascular accident with deficit 0 filed at 03/05/2025 1635   Modified Frailty Index Calculator 0 filed at 03/05/2025 1635          [id number] Stroke Risk Points  Current as of just now        N/A 0 to 9 Points:      Last Change: N/A          The [id number] risk score (Lip SANG, et al. 2009. © 2010 [employer]) quantifies the risk of stroke for a patient with atrial fibrillation. For patients without atrial fibrillation or under the age of 18 this score appears as N/A. Higher score values generally indicate higher risk of stroke.        This score is not applicable to this patient. Components are not calculated.          Revised Cardiac Risk Index      Flowsheet Row Pre-Admission Testing from 3/5/2025 in [facility]   High-Risk Surgery (Intraperitoneal, Intrathoracic,Suprainguinal vascular) 0 filed at 03/05/2025 1635   History of ischemic heart disease (History of MI, History of positive exercuse test, Current chest paint considered due to myocardial ischemia, Use of nitrate therapy, ECG with pathological Q Waves) 0 filed at 03/05/2025 1635   History of congestive heart failure (pulmonary edemia, bilateral rales or S3 gallop, Paroxysmal nocturnal dyspnea, CXR showing pulmonary vascular redistribution) 0 filed at 03/05/2025 1635   History of cerebrovascular disease (Prior TIA or stroke) 0 filed at 03/05/2025 1635   Pre-operative insulin treatment 0 filed at 03/05/2025  1635   Pre-operative creatinine>2 mg/dl 0 filed at 03/05/2025 1635   Revised Cardiac Risk Calculator 0 filed at 03/05/2025 1635          Apfel Simplified Score    No data to display       Risk Analysis Index Results This Encounter    No data found in the last 10 encounters.       Stop Bang Score      Flowsheet Row Pre-Admission Testing from 3/5/2025 in [facility]   Do you snore loudly? 0 filed at 03/05/2025 1619   Do you often feel tired or fatigued after your sleep? 0 filed at 03/05/2025 1619   Has anyone ever observed you stop breathing in your sleep? 0 filed at 03/05/2025 1619   Do you have or are you being treated for high blood pressure? 0 filed at 03/05/2025 1619   Recent BMI (Calculated) 26.3 filed at 03/05/2025 1619   Is BMI greater than 35 kg/m2? 0=No filed at 03/05/2025 1619   Age older than 50 years old? 0=No filed at 03/05/2025 1619   Is your neck circumference greater than 17 inches (Male) or 16 inches (Female)? 0 filed at 03/05/2025 1619   Gender - Male 1=Yes filed at 03/05/2025 1619   STOP-BANG Total Score 1 filed at 03/05/2025 1619          Prodigy: High Risk  Total Score: 8              Prodigy Gender Score          ARISCAT Score for Postoperative Pulmonary Complications      Flowsheet Row Pre-Admission Testing from 3/5/2025 in [facility]   Age Calculated Score 0 filed at 03/05/2025 1636   Preoperative SpO2 0 filed at 03/05/2025 1636   Respiratory infection in the last month Either upper or lower (i.e., URI, bronchitis, pneumonia), with fever and antibiotic treatment 0 filed at 03/05/2025 1636   Preoperative anemia (Hgb less than 10 g/dl) 0 filed at 03/05/2025 1636   Surgical incision  0 filed at 03/05/2025 1636   Duration of surgery  0 filed at 03/05/2025 1636   Emergency Procedure  0 filed at 03/05/2025 1636   ARISCAT Total Score  0 filed at 03/05/2025 1636          Rojas Perioperative Risk for Myocardial Infarction or Cardiac Arrest (RIAZ)      Flowsheet Row  Pre-Admission Testing from 3/5/2025 in [facility]   Calculated Age Score 0.8 filed at 03/05/2025 1636   Functional Status  0 filed at 03/05/2025 1636   ASA Class  -5.17 filed at 03/05/2025 1636   Creatinine 0 filed at 03/05/2025 1636   Type of Procedure  -0.26 filed at 03/05/2025 1636   RIAZ Total Score  -9.88 filed at 03/05/2025 1636   RIAZ % 0.01 filed at 03/05/2025 1636          Assessment & Plan:    Neuro:  No diagnosis or significant findings on chart review or clinical presentation and evaluation.     HEENT/Airway:  No diagnosis or significant findings on chart review or clinical presentation and evaluation.   STOP-BANG Score-1 points low risk for ESTELLE    Mallampati::  II    TM distance::  >3 FB    Neck ROM::  Full  Dentures-denies  Crowns-denies  Implants-denies    Cardiovascular:  No diagnosis or significant findings on chart review or clinical presentation and evaluation.   METS: 9.9  RCRI: 0 points, 3.9%  risk for postoperative MACE   RIAZ: 0.01% risk for postoperative MACE    Pulmonary:  No diagnosis or significant findings on chart review or clinical presentation and evaluation.   ARISCAT: <26 points, 1.6% risk of in-hospital postoperative pulmonary complication  PRODIGY: Moderate risk for opioid induced respiratory depression  Smoking History-He has never smoked.  Discussed smoking cessation and deep breathing handout given    Renal/Urinary:  No diagnosis or significant findings on chart review or clinical presentation and evaluation, however, the patient is at increased risk of perioperative renal complications secondary to male sex. Preventative measures include BP monitoring, medication compliance, and hydration management.   CMP-Reviewed, stable  Creatinine-1.06  GFR-91    Endocrine:  No diagnosis or significant findings on chart review or clinical presentation and evaluation.   KYA9T-6.1%    Hematologic/Immunology:  No diagnosis or significant findings on chart review or clinical  presentation and evaluation.  The patient is not a Jehovah’s witness and will accept blood and blood products if medically indicated.   History of previous blood transfusions No  CBC-Reviewed, stable  HGB-15.0  Positive for thrombocytopenia-Platelets 141. No abnormal bruising or bleeding.   Caprini Score 3, patient at Moderate for postoperative DVT. Pt supplied education/VTE handout  Anticoagulation use: No     Gastrointestinal:   No diagnosis or significant findings on chart review or clinical presentation and evaluation.   Recreational drug use: alcohol  Alcohol use social drinker    Infectious disease:   No diagnosis or significant findings on chart review or clinical presentation and evaluation.     Musculoskeletal:   No diagnosis or significant findings on chart review or clinical presentation and evaluation.   JHFRAT score-0 points. low risk for falls    Anesthesia:  ASA 1 - Normal health patient  Anticipated anesthesia-general  History of General anesthesia- yes  Complications- No anesthesia complications  No family history of anesthesia complications    Abnormalities noted on PAT evaluation: No    Labs & Imaging ordered:  NA  Nickel/metal allergy-negative  Shellfish allergy-negative    Overall, patient Low Risk for the scheduled Low Risk surgery. Discussed with patient medication instructions, NPO guidelines, and any questions or concerns.     Face to face time spent 30 minutes

## 2025-03-14 ENCOUNTER — HOSPITAL ENCOUNTER (OUTPATIENT)
Facility: HOSPITAL | Age: 40
Setting detail: OUTPATIENT SURGERY
Discharge: HOME | End: 2025-03-14
Attending: UROLOGY | Admitting: UROLOGY
Payer: COMMERCIAL

## 2025-03-14 ENCOUNTER — ANESTHESIA (OUTPATIENT)
Dept: OPERATING ROOM | Facility: HOSPITAL | Age: 40
End: 2025-03-14
Payer: COMMERCIAL

## 2025-03-14 ENCOUNTER — ANESTHESIA EVENT (OUTPATIENT)
Dept: OPERATING ROOM | Facility: HOSPITAL | Age: 40
End: 2025-03-14
Payer: COMMERCIAL

## 2025-03-14 VITALS
HEART RATE: 62 BPM | SYSTOLIC BLOOD PRESSURE: 123 MMHG | WEIGHT: 164.68 LBS | TEMPERATURE: 96.8 F | DIASTOLIC BLOOD PRESSURE: 88 MMHG | RESPIRATION RATE: 15 BRPM | BODY MASS INDEX: 25.85 KG/M2 | OXYGEN SATURATION: 99 % | HEIGHT: 67 IN

## 2025-03-14 DIAGNOSIS — Z30.09 VASECTOMY EVALUATION: Primary | ICD-10-CM

## 2025-03-14 PROCEDURE — 2500000004 HC RX 250 GENERAL PHARMACY W/ HCPCS (ALT 636 FOR OP/ED): Performed by: UROLOGY

## 2025-03-14 PROCEDURE — 7100000010 HC PHASE TWO TIME - EACH INCREMENTAL 1 MINUTE: Performed by: UROLOGY

## 2025-03-14 PROCEDURE — 2500000004 HC RX 250 GENERAL PHARMACY W/ HCPCS (ALT 636 FOR OP/ED): Performed by: NURSE ANESTHETIST, CERTIFIED REGISTERED

## 2025-03-14 PROCEDURE — 3700000002 HC GENERAL ANESTHESIA TIME - EACH INCREMENTAL 1 MINUTE: Performed by: UROLOGY

## 2025-03-14 PROCEDURE — A4649 SURGICAL SUPPLIES: HCPCS | Performed by: UROLOGY

## 2025-03-14 PROCEDURE — 7100000009 HC PHASE TWO TIME - INITIAL BASE CHARGE: Performed by: UROLOGY

## 2025-03-14 PROCEDURE — 55250 REMOVAL OF SPERM DUCT(S): CPT | Performed by: UROLOGY

## 2025-03-14 PROCEDURE — 7100000001 HC RECOVERY ROOM TIME - INITIAL BASE CHARGE: Performed by: UROLOGY

## 2025-03-14 PROCEDURE — 3600000001 HC OR TIME - INITIAL BASE CHARGE - PROCEDURE LEVEL ONE: Performed by: UROLOGY

## 2025-03-14 PROCEDURE — 3600000006 HC OR TIME - EACH INCREMENTAL 1 MINUTE - PROCEDURE LEVEL ONE: Performed by: UROLOGY

## 2025-03-14 PROCEDURE — 2720000007 HC OR 272 NO HCPCS: Performed by: UROLOGY

## 2025-03-14 PROCEDURE — 3700000001 HC GENERAL ANESTHESIA TIME - INITIAL BASE CHARGE: Performed by: UROLOGY

## 2025-03-14 PROCEDURE — 7100000002 HC RECOVERY ROOM TIME - EACH INCREMENTAL 1 MINUTE: Performed by: UROLOGY

## 2025-03-14 RX ORDER — OXYCODONE HYDROCHLORIDE 5 MG/1
5 TABLET ORAL EVERY 4 HOURS PRN
Status: DISCONTINUED | OUTPATIENT
Start: 2025-03-14 | End: 2025-03-14 | Stop reason: HOSPADM

## 2025-03-14 RX ORDER — LABETALOL HYDROCHLORIDE 5 MG/ML
5 INJECTION, SOLUTION INTRAVENOUS ONCE AS NEEDED
Status: DISCONTINUED | OUTPATIENT
Start: 2025-03-14 | End: 2025-03-14 | Stop reason: HOSPADM

## 2025-03-14 RX ORDER — FENTANYL CITRATE 50 UG/ML
INJECTION, SOLUTION INTRAMUSCULAR; INTRAVENOUS AS NEEDED
Status: DISCONTINUED | OUTPATIENT
Start: 2025-03-14 | End: 2025-03-14

## 2025-03-14 RX ORDER — PROPOFOL 10 MG/ML
INJECTION, EMULSION INTRAVENOUS AS NEEDED
Status: DISCONTINUED | OUTPATIENT
Start: 2025-03-14 | End: 2025-03-14

## 2025-03-14 RX ORDER — CEFAZOLIN 1 G/1
INJECTION, POWDER, FOR SOLUTION INTRAVENOUS AS NEEDED
Status: DISCONTINUED | OUTPATIENT
Start: 2025-03-14 | End: 2025-03-14

## 2025-03-14 RX ORDER — LIDOCAINE HYDROCHLORIDE 10 MG/ML
INJECTION, SOLUTION EPIDURAL; INFILTRATION; INTRACAUDAL; PERINEURAL AS NEEDED
Status: DISCONTINUED | OUTPATIENT
Start: 2025-03-14 | End: 2025-03-14

## 2025-03-14 RX ORDER — MEPERIDINE HYDROCHLORIDE 25 MG/ML
12.5 INJECTION INTRAMUSCULAR; INTRAVENOUS; SUBCUTANEOUS EVERY 10 MIN PRN
Status: DISCONTINUED | OUTPATIENT
Start: 2025-03-14 | End: 2025-03-14 | Stop reason: HOSPADM

## 2025-03-14 RX ORDER — FENTANYL CITRATE 50 UG/ML
25 INJECTION, SOLUTION INTRAMUSCULAR; INTRAVENOUS EVERY 5 MIN PRN
Status: DISCONTINUED | OUTPATIENT
Start: 2025-03-14 | End: 2025-03-14 | Stop reason: HOSPADM

## 2025-03-14 RX ORDER — ONDANSETRON HYDROCHLORIDE 2 MG/ML
INJECTION, SOLUTION INTRAVENOUS AS NEEDED
Status: DISCONTINUED | OUTPATIENT
Start: 2025-03-14 | End: 2025-03-14

## 2025-03-14 RX ORDER — SODIUM CHLORIDE, SODIUM LACTATE, POTASSIUM CHLORIDE, CALCIUM CHLORIDE 600; 310; 30; 20 MG/100ML; MG/100ML; MG/100ML; MG/100ML
INJECTION, SOLUTION INTRAVENOUS CONTINUOUS PRN
Status: DISCONTINUED | OUTPATIENT
Start: 2025-03-14 | End: 2025-03-14

## 2025-03-14 RX ORDER — LIDOCAINE HYDROCHLORIDE 10 MG/ML
0.1 INJECTION, SOLUTION EPIDURAL; INFILTRATION; INTRACAUDAL; PERINEURAL ONCE
Status: DISCONTINUED | OUTPATIENT
Start: 2025-03-14 | End: 2025-03-14 | Stop reason: HOSPADM

## 2025-03-14 RX ORDER — MIDAZOLAM HYDROCHLORIDE 1 MG/ML
INJECTION, SOLUTION INTRAMUSCULAR; INTRAVENOUS AS NEEDED
Status: DISCONTINUED | OUTPATIENT
Start: 2025-03-14 | End: 2025-03-14

## 2025-03-14 RX ORDER — FENTANYL CITRATE 50 UG/ML
50 INJECTION, SOLUTION INTRAMUSCULAR; INTRAVENOUS EVERY 5 MIN PRN
Status: DISCONTINUED | OUTPATIENT
Start: 2025-03-14 | End: 2025-03-14 | Stop reason: HOSPADM

## 2025-03-14 RX ORDER — ONDANSETRON HYDROCHLORIDE 2 MG/ML
4 INJECTION, SOLUTION INTRAVENOUS ONCE AS NEEDED
Status: DISCONTINUED | OUTPATIENT
Start: 2025-03-14 | End: 2025-03-14 | Stop reason: HOSPADM

## 2025-03-14 RX ADMIN — LIDOCAINE HYDROCHLORIDE 5 ML: 10 INJECTION, SOLUTION EPIDURAL; INFILTRATION; INTRACAUDAL; PERINEURAL at 10:55

## 2025-03-14 RX ADMIN — DEXAMETHASONE SODIUM PHOSPHATE 4 MG: 4 INJECTION, SOLUTION INTRAMUSCULAR; INTRAVENOUS at 11:02

## 2025-03-14 RX ADMIN — PROPOFOL 200 MG: 10 INJECTION, EMULSION INTRAVENOUS at 10:55

## 2025-03-14 RX ADMIN — FENTANYL CITRATE 100 MCG: 50 INJECTION INTRAMUSCULAR; INTRAVENOUS at 10:52

## 2025-03-14 RX ADMIN — CEFAZOLIN 2 G: 1 INJECTION, POWDER, FOR SOLUTION INTRAMUSCULAR; INTRAVENOUS at 11:01

## 2025-03-14 RX ADMIN — SODIUM CHLORIDE, POTASSIUM CHLORIDE, SODIUM LACTATE AND CALCIUM CHLORIDE: 600; 310; 30; 20 INJECTION, SOLUTION INTRAVENOUS at 10:53

## 2025-03-14 RX ADMIN — ONDANSETRON 4 MG: 2 INJECTION INTRAMUSCULAR; INTRAVENOUS at 11:02

## 2025-03-14 RX ADMIN — MIDAZOLAM 2 MG: 1 INJECTION INTRAMUSCULAR; INTRAVENOUS at 10:52

## 2025-03-14 ASSESSMENT — PAIN - FUNCTIONAL ASSESSMENT
PAIN_FUNCTIONAL_ASSESSMENT: 0-10
PAIN_FUNCTIONAL_ASSESSMENT: 0-10
PAIN_FUNCTIONAL_ASSESSMENT: WONG-BAKER FACES
PAIN_FUNCTIONAL_ASSESSMENT: 0-10

## 2025-03-14 ASSESSMENT — PAIN SCALES - GENERAL
PAINLEVEL_OUTOF10: 0 - NO PAIN

## 2025-03-14 ASSESSMENT — COLUMBIA-SUICIDE SEVERITY RATING SCALE - C-SSRS
1. IN THE PAST MONTH, HAVE YOU WISHED YOU WERE DEAD OR WISHED YOU COULD GO TO SLEEP AND NOT WAKE UP?: NO
6. HAVE YOU EVER DONE ANYTHING, STARTED TO DO ANYTHING, OR PREPARED TO DO ANYTHING TO END YOUR LIFE?: NO
2. HAVE YOU ACTUALLY HAD ANY THOUGHTS OF KILLING YOURSELF?: NO

## 2025-03-14 NOTE — ANESTHESIA POSTPROCEDURE EVALUATION
Patient: Carlton Macdonald    Procedure Summary       Date: 03/14/25 Room / Location: [facility] / [facility]    Anesthesia Start: 1052 Anesthesia Stop: 1124    Procedure: Vasectomy Diagnosis:       Vasectomy evaluation      (Vasectomy evaluation [Z30.09])    Surgeons: Michael Cat MD Responsible Provider: Chino Freeman MD    Anesthesia Type: general ASA Status: 1            Anesthesia Type: general    Vitals Value Taken Time   /97 03/14/25 1130   Temp 35.9 °C (96.6 °F) 03/14/25 1121   Pulse 67 03/14/25 1130   Resp 19 03/14/25 1130   SpO2 99 % 03/14/25 1130       Anesthesia Post Evaluation    Patient location during evaluation: PACU  Patient participation: complete - patient participated  Level of consciousness: awake  Pain management: adequate  Airway patency: patent  Cardiovascular status: acceptable  Respiratory status: acceptable  Hydration status: acceptable  Postoperative Nausea and Vomiting: none        No notable events documented.

## 2025-03-14 NOTE — DISCHARGE INSTRUCTIONS
DEPARTMENT OF Urology  DISCHARGE INSTRUCTIONS VASECTOMY  Outpatient Surgery      Call Urology Department [phone] regular daytime business hours (8:00 am - 5:00 pm) and after 5:00 pm, ask for the Urology resident with any questions or concerns.      If it is a life-threatening situation, proceed to the nearest emergency department.           Thank you for the opportunity to care for you today.  Your health and healing are very important to us.  We hope we made you feel as comfortable as possible and are committed to your recovery and continued well-being.      The following is a brief overview of your procedure today. Some of the information contained on this summary may be confidential.  This information should be kept in your records and should be shared with your regular doctor.    Physicians:   Dr. Michael Cat      Procedure performed: Vasectomy    What to Expect During your Recovery and Home Care  Anesthesia Side Effects   You received anesthesia today.  You may feel sleepy, tired, or have a sore throat.   You may also feel drowsiness, dizziness, or inability to think clearly.  For your safety, do not drive, drink alcoholic beverages, take any unprescribed medication or make any important decisions for 24 hours.  A responsible adult should be with you for 24 hours.       Activity and Recovery    The evening after your surgery:  1. Go home and rest. Stay off your feet. Apply an ice pack to the scrotum. Place the ice pack on top of the scrotal support. Do not apply the ice pack directly to the scrotal skin.  2. After four hours, check the scrotum for any swelling or blue discoloration. It is normal to see swelling up to the size of a plum. It is normal to see some blue discoloration to the scrotal skin, which is due to the injection of the local anesthetic. It is NOT normal for the scrotum to look like an eggplant. If the scrotum looks like an eggplant, immediately call [facility]  [facility] [place] at [phone] and ask to speak to the Urology Resident on call.     The day after surgery:  1. You are allowed to resume as much activity as you can tolerate. If you have pain, it usually indicates you are doing too much. Decrease your activity. Avoid strenuous activity including heavy lifting for a week.   2. No sex/masturbation or ejaculation for seven days or you risk blowing out the sutures and enhancing the possibility of recanalization (the end of the vas growing back together).   3. Wear scrotal support for 7-10 days. This provides support to the incisions and decreases the movement of the testicles and will diminish the amount of post-operative pain.    Pain Control  Unfortunately, you may experience pain after your procedure.  Adequate management can include alternative measures to help ease your pain and can include ice packs, scrotal support, and over the counter tylenol. Do not take more than 4,000 mg of tylenol in a 24 hour period.     Nausea/Vomiting   Clear liquids are best tolerated at first. Start slow, advance your diet as tolerated to normal foods. Avoid spicy, greasy, heavy foods at first. Also, you may feel nauseous or like you need to vomit if you take any type of medication on an empty stomach.  Call your physician if you are unable to eat or drink and have persistent vomiting.          Signs of Bleeding   Minor bleeding or drainage may occur from the surgical site, however, excessive or consistent bleeding should be reported to your surgeon. Excessive bleeding is defined as blood that is dripping from wound, soaking you bandages, and is ketchup colored, thick with possible blood clots.  Consistent is defined as bleeding that does not stop.    Treatment/wound care:   Keep area(s) clean and dry. You can wear gauze dressing on top of the incisions so the scrotal support does not irritate the incisions.   It is okay to shower 24 hours after time of surgery.  Wait three days  before taking a bath. No swimming until the incision are completely healed.   Do not scrub wound(s), pat dry.  Clean with mild soap. Do not use oils or lotions on incisions.    Please visually inspect your wound(s) at least once daily.  If the wound(s) are in a difficult to see location, please use a mirror or have someone else assist with visual inspection.    Signs of Infection  Signs of infection can include fever, excessive swelling, heat, drainage, redness, or severe pain.  If you see any of these occur, please contact your doctor's office at [phone] during regular business hours 8AM-5PM or if after 5PM [phone] and ask for the Urology resident on call.  Any fever higher than 100.4, especially if associated with an ill feeling, abdominal pain, chills, or nausea should be reported to your surgeon.        Additional Instructions:   AFTER 7 DAYS YOU MAY RESUME SEXUAL INTERCOURSE.  YOU ARE STILL FERTILE AND MUST USE BIRTH CONTROL UNTIL YOUR PHYSICIAN CLEARS YOU.    Continue using birth control until a semen sample is negative for sperm.      After about 4 months you can call the Urology office at [phone] before you plan to drop off a semen specimen.  We will place the order in the computer if not already done.  We will look at the specimen and document the findings. You will receive a call or MyChart Message from the office giving you the results. (If you do hear from the office within five days, call [phone]) You can drop off your sample to the outpatient lab within one hour of it being collected. Do not refrigerate.     If the semen sample is positive and sperm are identified, you are still fertile and will need to continue birth control. You will be instructed to bring in a second semen sample in four weeks.     If the sample is negative and no sperm identified, you may stop birth control

## 2025-03-14 NOTE — ANESTHESIA PREPROCEDURE EVALUATION
Patient: Carlton Macdonald    Procedure Information       Date/Time: 03/14/25 1005    Procedure: Vasectomy    Location: [facility]    Surgeons: Michael Cat MD          History reviewed. No pertinent past medical history.     Relevant Problems   No relevant active problems       Clinical information reviewed:   Tobacco  Allergies  Meds   Med Hx  Surg Hx   Fam Hx  Soc Hx        NPO Detail:  NPO/Void Status  Date of Last Liquid: 03/14/25  Time of Last Liquid: 0645  Date of Last Solid: 03/13/25  Time of Last Solid: 1930  Time of Last Void: 0700         Physical Exam    Airway  Mallampati: II  TM distance: >3 FB  Neck ROM: full     Cardiovascular    Dental    Pulmonary    Abdominal            Anesthesia Plan    History of general anesthesia?: yes  History of complications of general anesthesia?: no    ASA 1     general     intravenous induction   Anesthetic plan and risks discussed with patient.    Plan discussed with CAA.

## 2025-03-14 NOTE — OP NOTE
Vasectomy Operative Note     Date: 3/14/2025  OR Location: [facility]    Name: Carlton Macdonald, : 1985, Age: 40 y.o., MRN: [id number], Sex: male    Diagnosis  Pre-op Diagnosis      * Vasectomy evaluation [Z30.09] Post-op Diagnosis     * Vasectomy evaluation [Z30.09]     Procedures  Vasectomy  43731 - IN VASECTOMY UNI/BI SPX W/POSTOP SEMEN EXAMS      Surgeons      * Michael Cat - Primary    Resident/Fellow/Other Assistant:  Surgeons and Role:     * Gabino Oconnor MD - Resident - Assisting    Staff:   Circulator: Nanci Miller Person: Beatris    Anesthesia Staff: Anesthesiologist: Chino Freeman MD  CRNA: JADEN Duarte-CRNA  SRNA: Pedro Dejesus    Procedure Summary  Anesthesia: General  ASA: I  Estimated Blood Loss: 2mL  Intra-op Medications:   Administrations occurring from 1005 to 1105 on 25:   Medication Name Total Dose   ceFAZolin (Ancef) vial 1 g 2 g   dexAMETHasone (Decadron) injection 4 mg/mL 4 mg   fentaNYL (Sublimaze) injection 50 mcg/mL 100 mcg   LR infusion Cannot be calculated   lidocaine PF (Xylocaine-MPF) local injection 1 % 5 mL   midazolam (Versed) injection 1 mg/mL 2 mg   ondansetron (Zofran) 2 mg/mL injection 4 mg   propofol (Diprivan) injection 10 mg/mL 200 mg              Anesthesia Record               Intraprocedure I/O Totals          Intake    LR infusion 700.00 mL    Total Intake 700 mL          Specimen: No specimens collected              Drains and/or Catheters: * None in log *    Tourniquet Times:         Implants:     Findings: normal anatomy    Indications: Carlton Macdonald is an 40 y.o. male who is having surgery for Vasectomy evaluation [Z30.09].     The patient was seen in the preoperative area. The risks, benefits, complications, treatment options, non-operative alternatives, expected recovery and outcomes were discussed with the patient. The possibilities of reaction to medication, pulmonary aspiration, injury to surrounding structures, bleeding,  recurrent infection, the need for additional procedures, failure to diagnose a condition, and creating a complication requiring transfusion or operation were discussed with the patient. The patient concurred with the proposed plan, giving informed consent.  The site of surgery was properly noted/marked if necessary per policy. The patient has been actively warmed in preoperative area. Preoperative antibiotics are not indicated. Venous thrombosis prophylaxis have been ordered including bilateral sequential compression devices    Procedure Details:     After obtaining informed consent, the patient  was brought to the OR where a time out was preformed. He was prepped and draped in the standard sterile fashion in the lower abdominal and genitalia region.        Starting on the patient's right hemiscrotum, the vas deferens was identified and isolated. Local analgesia was performed using a 50:50 mixture of 1% lidocaine and 1% bupivicaine to anesthetize the skin and the perivasal tissue.  A 1 cm horizontal incision was made and the vas deferens was isolated with a vas clamp. The vasal tissue was incised and the vas deferens was brought out through the wound.      An identical procedure was performed on the patients left side.       The middle segment of both vas deferens was then excised and passed off of the field. The musocal was cauterized with needle tip bovie electrocautery. After obtaining adequate hemostasis, the distal vasal end was put back into the scrotum. The incisions were closed with 4-0 Monocryl and dermabond.  Scrotal support and fluffs were applied. He tolerated the procedure well and was sent home in stable condition.     Complications:  None; patient tolerated the procedure well.    Disposition: PACU - hemodynamically stable.  Condition: stable         Attending Attestation: Present/Scrubbed for procedure.    Michael Cat  Phone Number: [phone]

## 2025-03-19 ENCOUNTER — OFFICE VISIT (OUTPATIENT)
Dept: UROLOGY | Facility: CLINIC | Age: 40
End: 2025-03-19
Payer: COMMERCIAL

## 2025-03-19 VITALS — TEMPERATURE: 97.8 F

## 2025-03-19 DIAGNOSIS — Z30.09 VASECTOMY EVALUATION: ICD-10-CM

## 2025-03-19 PROCEDURE — 99024 POSTOP FOLLOW-UP VISIT: CPT | Performed by: UROLOGY

## 2025-03-19 PROCEDURE — 1036F TOBACCO NON-USER: CPT | Performed by: UROLOGY

## 2025-03-19 RX ORDER — FLUTICASONE PROPIONATE 50 MCG
2 SPRAY, SUSPENSION (ML) NASAL
COMMUNITY
Start: 2025-03-10

## 2025-03-19 ASSESSMENT — PAIN SCALES - GENERAL: PAINLEVEL_OUTOF10: 5

## 2025-03-19 NOTE — PROGRESS NOTES
"Last visit  Vasectomy in OR 3/14/25    Today's visit:    S/p vasectomy in OR  - vasectomy on 3/14/25  -here with some bruising and uncomfortable swelling post vasectomy  -some discomfort  -denies f/c/n/v  -denies pus    Labs  No results found for: \"TESTOSTERONE\"  No results found for: \"LH\"  No results found for: \"FSH\"  No components found for: \"ESTRADIAL\"  No results found for: \"PSA\"  No components found for: \"CBC\"  No results found for: \"PROLACTIN\"  Lab Results   Component Value Date    HGBA1C 5.1 02/19/2025     No components found for: \"HEMATOCRIT\"      Medications:    Current Outpatient Medications:     fluticasone (Flonase) 50 mcg/actuation nasal spray, 2 sprays by Does not apply route once daily., Disp: , Rfl:     imiquimod (Aldara) 5 % cream, Apply to the affected asha of the Once daily. Wash hands thoroughly after each application., Disp: 30 packet, Rfl: 0    multivitamin tablet, Take 1 tablet by mouth once daily., Disp: , Rfl:     Allergy:  Allergies   Allergen Reactions    Pollen Extracts Itching     runny nose, sneezing        Exam  Testicles descended bilaterally, nontender, no masses  Vasa palpable bilaterally  Penis circ'd, no lesions, no plaques   Diffuse bruising  Mild hematoma present  Left superior scrotum into the groin    Assessment/Plan  #Post vasectomy bruising  -discussed occurrence of hematoma  -discussed conservative measures with ice, elevation  -pain control prn  -warning signs reviewed    Fu in 2 weeks    Scribe Attestation  By signing my name below, IAnne , Miguel   attest that this documentation has been prepared under the direction and in the presence of Michael Cat MD.    "

## 2025-03-22 ENCOUNTER — TELEPHONE (OUTPATIENT)
Dept: UROLOGY | Facility: HOSPITAL | Age: 40
End: 2025-03-22
Payer: COMMERCIAL

## 2025-03-22 DIAGNOSIS — S30.22XA SCROTAL HEMATOMA: Primary | ICD-10-CM

## 2025-03-22 RX ORDER — GABAPENTIN 100 MG/1
100 CAPSULE ORAL 3 TIMES DAILY
Qty: 90 CAPSULE | Refills: 0 | Status: SHIPPED | OUTPATIENT
Start: 2025-03-22 | End: 2025-04-21

## 2025-03-22 NOTE — TELEPHONE ENCOUNTER
Patient called the triage line due to persistent scrotal pain.    He reports that he is having some lower abdominal discomfort as well as some tingling of his inner thigh. He denies any fevers, chills, nausea, and vomiting.    He reports the pain is worse with certain movements like standing however he can find a comfortable position while seated. States the bruising looks about the same and he is not having any oozing from incision.    Discussed importance of scrotal support and compression and educated him on how to best do this. Will send patient Gabapentin for neuropathy-like pain and told him that he should continue alternating Tylenol and Ibuprofen. Also discussed with him that none of these findings are immediately concerning however if pain worsens tomorrow would recommend he go to ED for scrotal US.    Patient was appreciative of the call.    Stefani Adams DO  Urology Resident, PGY-4  Adult Urology: [id number]    Pediatric Urology: [id number]

## 2025-03-27 ENCOUNTER — TELEPHONE (OUTPATIENT)
Dept: UROLOGY | Facility: CLINIC | Age: 40
End: 2025-03-27

## 2025-03-27 ENCOUNTER — APPOINTMENT (OUTPATIENT)
Dept: DERMATOLOGY | Facility: CLINIC | Age: 40
End: 2025-03-27
Payer: COMMERCIAL

## 2025-03-27 NOTE — TELEPHONE ENCOUNTER
Patient scheduled to see Jackelyn 3/28 at 1:20 pm.     ----- Message from Michael Cat sent at 3/27/2025  8:44 AM EDT -----  Regarding: RE:   Have him see someone in person please.   ----- Message -----  From: Kathy Zamorano RN  Sent: 3/26/2025  11:25 AM EDT  To: Michael Cat MD    Patient had vasectomy with you 3/14 in the OR. He followed up 3/19 for bruising and swelling. Here were your exam findings from that visit:     Testicles descended bilaterally, nontender, no masses  Vasa palpable bilaterally, Penis circ'd, no lesions, no plaques  Diffuse bruising,   Mild hematoma present  Left superior scrotum into the groin    He called the office today reporting that his pain is improving but he is still swollen on his left side up to his abdomen. He also said the left testicle is very firm and tender to touch. He said his bruising is improving. He denies any pus, fevers, chills, N/V. He has a follow up scheduled with you on 4/3. I wasn't sure if you wanted an ultrasound of anything before that appointment? I can get him in to see Jackelyn too for an exam if you think it's needed.    Thanks!

## 2025-03-28 ENCOUNTER — OFFICE VISIT (OUTPATIENT)
Dept: UROLOGY | Facility: CLINIC | Age: 40
End: 2025-03-28
Payer: COMMERCIAL

## 2025-03-28 DIAGNOSIS — S30.22XA SCROTAL HEMATOMA: Primary | ICD-10-CM

## 2025-03-28 PROCEDURE — 1036F TOBACCO NON-USER: CPT | Performed by: NURSE PRACTITIONER

## 2025-03-28 PROCEDURE — 99024 POSTOP FOLLOW-UP VISIT: CPT | Performed by: NURSE PRACTITIONER

## 2025-03-28 RX ORDER — IBUPROFEN 200 MG
200 TABLET ORAL EVERY 6 HOURS
COMMUNITY

## 2025-03-28 RX ORDER — ACETAMINOPHEN 500 MG
TABLET ORAL EVERY 6 HOURS PRN
COMMUNITY

## 2025-03-28 ASSESSMENT — PAIN SCALES - GENERAL: PAINLEVEL_OUTOF10: 6

## 2025-03-28 NOTE — PROGRESS NOTES
[facility]  Outpatient Clinic Note    Subjective   Carlton Macdonald is a 40 y.o. male    History of Present Illness   Patient presenting to clinic today with complaint of left scrotal and inguinal pain and swelling since vasectomy with Dr. Cat 3/14/2025. He denies Fevers, chills, n/v, no drainage. No bothersome urinary symptoms.     Past Medical History and Surgical History   No past medical history on file.  Past Surgical History:   Procedure Laterality Date    CHOLESTEATOMA EXCISION         Medications  Current Outpatient Medications on File Prior to Visit   Medication Sig Dispense Refill    acetaminophen (Tylenol Extra Strength) 500 mg tablet Take by mouth every 6 hours if needed for mild pain (1 - 3).      fluticasone (Flonase) 50 mcg/actuation nasal spray 2 sprays by Does not apply route once daily.      gabapentin (Neurontin) 100 mg capsule Take 1 capsule (100 mg) by mouth 3 times a day. 90 capsule 0    ibuprofen 200 mg tablet Take 1 tablet (200 mg) by mouth every 6 hours.      imiquimod (Aldara) 5 % cream Apply to the affected asha of the Once daily. Wash hands thoroughly after each application. 30 packet 0    multivitamin tablet Take 1 tablet by mouth once daily.       No current facility-administered medications on file prior to visit.       Objective   Physicial Exam  General: Well developed, well nourished, alert and cooperative, appears in no acute distress  Eyes: Non-injected conjunctiva, sclera clear, no proptosis  Cardiac: Extremities are warm and well perfused. No edema, cyanosis or pallor.   Lungs: Breathing is easy, non-labored. Speaking in clear and complete sentences. Normal diaphragmatic movement.  MSK: Ambulatory with steady gait, unassisted  Neuro: alert and oriented to person, place and time  Psych: Demonstrates good judgement and reason, without hallucinations, abnormal affect or abnormal behaviors.  Skin: no obvious lesions, no rashes.    Scrotum: no lesions, no cysts, no  rashes  Epididymides: normal size, position, symmetric, no masses or induration  Testis: descended bilaterally, symmetric, nontender  Urethra and meatus: normal size, position, no lesions or discharge  Penis: circ'd, no palpable plaque, no lesions  Diffuse bruising  Mild hematoma on Left     No visits with results within 30 Day(s) from this visit.   Latest known visit with results is:   [facility] Encounter on 08/22/1990   Component Date Value Ref Range Status    Pathology Report 08/22/1990    Final                    Value:Name REA ROLON                                                                                                   Accession #: [id number]            Pathologist:                     Date of Procedure:      Date Received:            Date Reported             Submitting Physician:     Location:                      Other External #                                                                    FINAL DIAGNOSIS  CHOLESTEATOMA, CLINICALLY RIGHT EAR.                                                                                                                                                                                                                                                                                                                                                                                                                                                                                                 Clinical History:  (Not Provided)    Specimens Submitted As:  A                          : CHOLESTOMA RIGHT EAR     Gross Description:  (Not Entered)                            CONVERTED FINAL DIAGNOSIS 08/22/1990 CHOLESTEATOMA, CLINICALLY RIGHT EAR.   Final    CONVERTED FINAL REPORT PDF LINK TO* 08/22/1990 \\[facility]\copath\PDF [id number]   Final      Review of Systems  All other systems have been reviewed and are negative for complaint.      Assessment and Plan      - Post vasectomy swelling and bruising  -discussed occurrence of hematoma  -discussed conservative measures with ice, elevation  -pain control prn  -warning signs reviewed    Patient has FUV with Dr. Cat next week     All questions and concerns were addressed. Patient verbalizes understanding and has no other questions at this time.     Jackelyn Chi-- LETHA STANLEY  Office Phone:  [phone]

## 2025-04-02 ENCOUNTER — APPOINTMENT (OUTPATIENT)
Dept: UROLOGY | Facility: CLINIC | Age: 40
End: 2025-04-02
Payer: COMMERCIAL

## 2025-04-10 ENCOUNTER — APPOINTMENT (OUTPATIENT)
Dept: DERMATOLOGY | Facility: CLINIC | Age: 40
End: 2025-04-10
Payer: COMMERCIAL

## 2025-04-22 NOTE — PROGRESS NOTES
"Last visit 3/19/25  Vasectomy in OR 3/14/25  #Post vasectomy bruising  -discussed occurrence of hematoma  -discussed conservative measures with ice, elevation  -pain control prn  -warning signs reviewed    Fu in 2 weeks    Today's visit:  Dr Adams's note and Jackelyn's note reviewed    S/p vasectomy in OR  - vasectomy on 3/14/25  -postop hematoma into left groin  -was taking Gabapentin for pain but has now stopped   -notes improvement   -denies f/c/n/v  -denies pus    Labs  No results found for: \"TESTOSTERONE\"  No results found for: \"LH\"  No results found for: \"FSH\"  No components found for: \"ESTRADIAL\"  No results found for: \"PSA\"  No components found for: \"CBC\"  No results found for: \"PROLACTIN\"  Lab Results   Component Value Date    HGBA1C 5.1 02/19/2025     No components found for: \"HEMATOCRIT\"      Medications:    Current Outpatient Medications:     acetaminophen (Tylenol Extra Strength) 500 mg tablet, Take by mouth every 6 hours if needed for mild pain (1 - 3)., Disp: , Rfl:     fluticasone (Flonase) 50 mcg/actuation nasal spray, 2 sprays by Does not apply route once daily., Disp: , Rfl:     gabapentin (Neurontin) 100 mg capsule, Take 1 capsule (100 mg) by mouth 3 times a day., Disp: 90 capsule, Rfl: 0    ibuprofen 200 mg tablet, Take 1 tablet (200 mg) by mouth every 6 hours., Disp: , Rfl:     imiquimod (Aldara) 5 % cream, Apply to the affected asha of the Once daily. Wash hands thoroughly after each application., Disp: 30 packet, Rfl: 0    multivitamin tablet, Take 1 tablet by mouth once daily., Disp: , Rfl:     Allergy:  Allergies   Allergen Reactions    Pollen Extracts Itching     runny nose, sneezing        Exam  Testicles descended bilaterally, nontender, no masses  Vasa palpable bilaterally  Penis circ'd, no lesions, no plaques   Diffuse bruising  Mild hematoma which is improving   Left superior scrotum into the groin    Assessment/Plan  #Post vasectomy bruising  -hematoma improving   -discussed conservative " measures with ice, elevation  -pain control prn  -warning signs reviewed  -discussed less likely to be hernia    Fu in 2 months    Scribe Attestation  By signing my name below, I, Anne Reed , Miguel   attest that this documentation has been prepared under the direction and in the presence of Michael Cat MD.

## 2025-04-23 ENCOUNTER — APPOINTMENT (OUTPATIENT)
Dept: UROLOGY | Facility: CLINIC | Age: 40
End: 2025-04-23
Payer: COMMERCIAL

## 2025-04-23 VITALS — BODY MASS INDEX: 25.79 KG/M2 | TEMPERATURE: 97.8 F | HEIGHT: 67 IN

## 2025-04-23 DIAGNOSIS — Z98.52 STATUS POST VASECTOMY: Primary | ICD-10-CM

## 2025-04-23 DIAGNOSIS — S30.22XA SCROTAL HEMATOMA: ICD-10-CM

## 2025-04-23 PROCEDURE — 99024 POSTOP FOLLOW-UP VISIT: CPT | Performed by: UROLOGY

## 2025-04-23 PROCEDURE — 1036F TOBACCO NON-USER: CPT | Performed by: UROLOGY

## 2025-04-23 ASSESSMENT — PAIN SCALES - GENERAL: PAINLEVEL_OUTOF10: 0-NO PAIN

## 2025-06-24 ENCOUNTER — APPOINTMENT (OUTPATIENT)
Dept: UROLOGY | Facility: CLINIC | Age: 40
End: 2025-06-24
Payer: COMMERCIAL

## 2025-07-22 ENCOUNTER — APPOINTMENT (OUTPATIENT)
Dept: UROLOGY | Facility: CLINIC | Age: 40
End: 2025-07-22
Payer: COMMERCIAL

## 2025-07-22 VITALS — TEMPERATURE: 97.3 F

## 2025-07-22 DIAGNOSIS — Z98.52 S/P VASECTOMY: Primary | ICD-10-CM

## 2025-07-22 PROCEDURE — 99212 OFFICE O/P EST SF 10 MIN: CPT | Performed by: UROLOGY

## 2025-07-22 PROCEDURE — 1036F TOBACCO NON-USER: CPT | Performed by: UROLOGY

## 2025-07-22 RX ORDER — GLUCOSAMINE/CHONDRO SU A 500-400 MG
1 TABLET ORAL DAILY
COMMUNITY

## 2025-07-22 ASSESSMENT — PAIN SCALES - GENERAL: PAINLEVEL_OUTOF10: 0-NO PAIN

## 2025-07-22 NOTE — PROGRESS NOTES
"Last visit 4/23/25  #Post vasectomy bruising  -hematoma improving   -discussed conservative measures with ice, elevation  -pain control prn  -warning signs reviewed  -discussed less likely to be hernia    Fu in 2 months    Today's visit:    S/p vasectomy in OR  - vasectomy on 3/14/25  -postop hematoma into left groin now resolved   -was taking Gabapentin for pain but has now stopped   -notes improvement   -denies f/c/n/v  -denies pus  -no dysuria or hematuria     Labs  No results found for: \"TESTOSTERONE\"  No results found for: \"LH\"  No results found for: \"FSH\"  No components found for: \"ESTRADIAL\"  No results found for: \"PSA\"  No components found for: \"CBC\"  No results found for: \"PROLACTIN\"  Lab Results   Component Value Date    HGBA1C 5.1 02/19/2025     No components found for: \"HEMATOCRIT\"      Medications:    Current Outpatient Medications:     imiquimod (Aldara) 5 % cream, Apply to the affected asha of the Once daily. Wash hands thoroughly after each application., Disp: 30 packet, Rfl: 0    multivitamin tablet, Take 1 tablet by mouth once daily., Disp: , Rfl:     acetaminophen (Tylenol Extra Strength) 500 mg tablet, Take by mouth every 6 hours if needed for mild pain (1 - 3). (Patient not taking: Reported on 7/22/2025), Disp: , Rfl:     fluticasone (Flonase) 50 mcg/actuation nasal spray, 2 sprays by Does not apply route once daily. (Patient not taking: Reported on 7/22/2025), Disp: , Rfl:     gabapentin (Neurontin) 100 mg capsule, Take 1 capsule (100 mg) by mouth 3 times a day., Disp: 90 capsule, Rfl: 0    glucosamine-chondroitin 500-400 mg tablet, Take 1 tablet by mouth once daily., Disp: , Rfl:     ibuprofen 200 mg tablet, Take 1 tablet (200 mg) by mouth every 6 hours. (Patient not taking: Reported on 7/22/2025), Disp: , Rfl:     Allergy:  Allergies   Allergen Reactions    Pollen Extracts Itching     runny nose, sneezing        Exam  Testicles descended bilaterally, nontender, no masses  Vasa palpable " bilaterally  Penis circ'd, no lesions, no plaques  Hematoma resolved     Assessment/Plan  #Post vasectomy bruising  -hematoma healed , exam wnl now  -discussed conservative measures with ice, elevation  -pain control prn  -warning signs reviewed  -discussed will improve with time     Fu prn    Scribe Attestation  By signing my name below, IRobertome Edna Reed , Scribe   attest that this documentation has been prepared under the direction and in the presence of Michael Cat MD.

## 2025-10-09 ENCOUNTER — APPOINTMENT (OUTPATIENT)
Dept: DERMATOLOGY | Facility: CLINIC | Age: 40
End: 2025-10-09
Payer: COMMERCIAL

## (undated) DEVICE — SOLUTION, IRRIGATION, X RX SODIUM CHL 0.9%, 500ML BTL

## (undated) DEVICE — NEEDLE, HYPODERMIC, PROEDGE, 25G X 5/8, ORANGE

## (undated) DEVICE — Device

## (undated) DEVICE — SOLUTION, INJECTION, USP, LACTATED RINGERS, LIFECARE, 1000ML

## (undated) DEVICE — ADULT REM POLYHESIVE II PATIENT RETURN ELECTRODE W/9 FT (2.7 M) ATTACHED CORD

## (undated) DEVICE — SYRINGE, 10 CC, SLIP TIP

## (undated) DEVICE — SUTURE, MONOCRYL, 4-0, 27 IN, PS-2, UNDYED

## (undated) DEVICE — GOWN, SURGICAL, ECLIPSE, FABRIC, 2XL, REINFORCED

## (undated) DEVICE — GLOVE, SURGICAL, PROTEXIS PI , 7.0, PF, LF

## (undated) DEVICE — NEEDLE, ELECTRODE, ELECTROSURGICAL, INSULATED